# Patient Record
Sex: MALE | Race: ASIAN | URBAN - METROPOLITAN AREA
[De-identification: names, ages, dates, MRNs, and addresses within clinical notes are randomized per-mention and may not be internally consistent; named-entity substitution may affect disease eponyms.]

---

## 2021-10-22 ENCOUNTER — OFFICE VISIT (OUTPATIENT)
Dept: URBAN - METROPOLITAN AREA CLINIC 91 | Facility: CLINIC | Age: 41
End: 2021-10-22
Payer: COMMERCIAL

## 2021-10-22 PROCEDURE — 99204 OFFICE O/P NEW MOD 45 MIN: CPT | Performed by: OPHTHALMOLOGY

## 2021-10-22 PROCEDURE — 92133 CPTRZD OPH DX IMG PST SGM ON: CPT | Performed by: OPHTHALMOLOGY

## 2021-10-22 RX ORDER — ACETAZOLAMIDE 500 MG/1
500 MG CAPSULE, EXTENDED RELEASE ORAL
Qty: 20 | Refills: 1 | Status: ACTIVE
Start: 2021-10-22

## 2021-10-22 RX ORDER — PREDNISOLONE ACETATE 10 MG/ML
1 % SUSPENSION/ DROPS OPHTHALMIC
Qty: 7.5 | Refills: 1 | Status: INACTIVE
Start: 2021-10-22 | End: 2021-11-22

## 2021-10-22 RX ORDER — DORZOLAMIDE HYDROCHLORIDE AND TIMOLOL MALEATE 20; 5 MG/ML; MG/ML
SOLUTION/ DROPS OPHTHALMIC
Qty: 5 | Refills: 1 | Status: ACTIVE
Start: 2021-10-22

## 2021-10-22 RX ORDER — LATANOPROST 50 UG/ML
0.005 % SOLUTION OPHTHALMIC
Qty: 2.5 | Refills: 1 | Status: INACTIVE
Start: 2021-10-22 | End: 2021-11-22

## 2021-10-22 ASSESSMENT — INTRAOCULAR PRESSURE
OD: 18
OS: 57
OS: 46

## 2021-10-22 ASSESSMENT — VISUAL ACUITY
OS: 20/40
OD: 20/20

## 2021-10-22 NOTE — IMPRESSION/PLAN
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 Plan: Patient with uveitic glaucoma. Angles appear slightly narrow but open on gonio and no associated iris bombe therefore making AACG unlikely in this case. We discussed that we don't always know the etiology for the inflammation. 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 Instructed the patient to begin dorz-fernanda bid OS, latanoprost qhs OS and prednisolone qid OS. Acetazolamide 500mg PO bid was also prescribed. [facility]

## 2021-10-25 ENCOUNTER — OFFICE VISIT (OUTPATIENT)
Dept: URBAN - METROPOLITAN AREA CLINIC 90 | Facility: CLINIC | Age: 41
End: 2021-10-25
Payer: COMMERCIAL

## 2021-10-25 DIAGNOSIS — H40.42X1 GLAUCOMA SECONDARY TO EYE INFLAMMATION, LEFT EYE, MILD STAGE: Primary | ICD-10-CM

## 2021-10-25 PROCEDURE — 99213 OFFICE O/P EST LOW 20 MIN: CPT | Performed by: OPHTHALMOLOGY

## 2021-10-25 ASSESSMENT — INTRAOCULAR PRESSURE
OS: 16
OD: 16

## 2021-10-25 NOTE — IMPRESSION/PLAN
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 Plan: Patient with uveitic glaucoma, explained inflammation has resolved, discussed it can re occur will continue to monitor, recommend to continue Latanoprost QHS OS, Dorzolamide BID OS, and PF 1% QID OS, D/c Diamox. Will consider reducing and discontinuing more medication next week, ok to return back to work.

## 2021-11-01 ENCOUNTER — OFFICE VISIT (OUTPATIENT)
Dept: URBAN - METROPOLITAN AREA CLINIC 90 | Facility: CLINIC | Age: 41
End: 2021-11-01
Payer: COMMERCIAL

## 2021-11-01 PROCEDURE — 99214 OFFICE O/P EST MOD 30 MIN: CPT | Performed by: OPHTHALMOLOGY

## 2021-11-01 ASSESSMENT — INTRAOCULAR PRESSURE
OD: 16
OS: 16

## 2021-11-01 NOTE — IMPRESSION/PLAN
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 Plan: Inflammation has resolved. Patient was instructed to reduce to Prednisolone by 1 drop each week, starting with TID OS this week. and Discontinue Latanoprost QHS OS. Continue dorzolmide BID OS.

## 2021-11-22 ENCOUNTER — OFFICE VISIT (OUTPATIENT)
Dept: URBAN - METROPOLITAN AREA CLINIC 90 | Facility: CLINIC | Age: 41
End: 2021-11-22
Payer: COMMERCIAL

## 2021-11-22 PROCEDURE — 99213 OFFICE O/P EST LOW 20 MIN: CPT | Performed by: OPHTHALMOLOGY

## 2021-11-22 ASSESSMENT — INTRAOCULAR PRESSURE
OD: 15
OS: 15

## 2021-11-22 NOTE — IMPRESSION/PLAN
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 Plan: Explained diagnosis with patient, Edema resolved, IOP's stable, recommend tapering down drops. continue using Dorzolamide QD BID OS, and  PF 1% QD OS x2 weeks then d/c. Recommend to see rheumatologist specialist for a full evaluation to r/o spondylitis.

## 2021-12-06 ENCOUNTER — OFFICE VISIT (OUTPATIENT)
Dept: URBAN - METROPOLITAN AREA CLINIC 90 | Facility: CLINIC | Age: 41
End: 2021-12-06
Payer: COMMERCIAL

## 2021-12-06 PROCEDURE — 99212 OFFICE O/P EST SF 10 MIN: CPT | Performed by: OPHTHALMOLOGY

## 2021-12-06 ASSESSMENT — INTRAOCULAR PRESSURE
OS: 11
OD: 13

## 2021-12-06 NOTE — IMPRESSION/PLAN
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 Plan: Improvement explained to patient. Instructed patient to d/c Dorzolmide QD. Patient still encouraged to follow up with Rheumatologist to r/o spondylitis, in network with PCP.

## 2022-02-14 ENCOUNTER — OFFICE VISIT (OUTPATIENT)
Dept: URBAN - METROPOLITAN AREA CLINIC 90 | Facility: CLINIC | Age: 42
End: 2022-02-14
Payer: COMMERCIAL

## 2022-02-14 PROCEDURE — 99213 OFFICE O/P EST LOW 20 MIN: CPT | Performed by: OPHTHALMOLOGY

## 2022-02-14 ASSESSMENT — INTRAOCULAR PRESSURE
OS: 13
OD: 13

## 2023-10-26 NOTE — IMPRESSION/PLAN
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 Plan: For secondary Glaucoma discuss with patient to d/c Dorzolamide and Timolol may use as needed. Per patient not taking steroids. IOP's stable today OU. Will continue to monitor. Per patient will schedule appt with rheumatologist soon for possible Ankylosing Spondylitis. Hx of Uveitic Glaucoma.  Labs will be done with Rheumatologist.
Female

## 2025-07-14 ENCOUNTER — OFFICE VISIT (OUTPATIENT)
Facility: LOCATION | Age: 45
End: 2025-07-14
Payer: COMMERCIAL

## 2025-07-14 DIAGNOSIS — H40.42X1 GLAUCOMA SECONDARY TO EYE INFLAMMATION, LEFT EYE, MILD STAGE: Primary | ICD-10-CM

## 2025-07-14 PROCEDURE — 99214 OFFICE O/P EST MOD 30 MIN: CPT | Performed by: OPHTHALMOLOGY

## 2025-07-14 PROCEDURE — 92250 FUNDUS PHOTOGRAPHY W/I&R: CPT | Performed by: OPHTHALMOLOGY

## 2025-07-14 ASSESSMENT — INTRAOCULAR PRESSURE
OD: 18
OS: 16

## 2025-08-18 ENCOUNTER — OFFICE VISIT (OUTPATIENT)
Facility: LOCATION | Age: 45
End: 2025-08-18
Payer: COMMERCIAL

## 2025-08-18 DIAGNOSIS — H40.42X1 GLAUCOMA SECONDARY TO EYE INFLAMMATION, LEFT EYE, MILD STAGE: Primary | ICD-10-CM

## 2025-08-18 PROCEDURE — 92133 CPTRZD OPH DX IMG PST SGM ON: CPT | Performed by: OPHTHALMOLOGY

## 2025-08-18 PROCEDURE — 99214 OFFICE O/P EST MOD 30 MIN: CPT | Performed by: OPHTHALMOLOGY

## 2025-08-18 PROCEDURE — 92083 EXTENDED VISUAL FIELD XM: CPT | Performed by: OPHTHALMOLOGY

## 2025-08-18 ASSESSMENT — INTRAOCULAR PRESSURE
OS: 15
OD: 15